# Patient Record
Sex: MALE | Race: WHITE | Employment: OTHER | URBAN - METROPOLITAN AREA
[De-identification: names, ages, dates, MRNs, and addresses within clinical notes are randomized per-mention and may not be internally consistent; named-entity substitution may affect disease eponyms.]

---

## 2017-07-17 ENCOUNTER — TELEPHONE (OUTPATIENT)
Dept: NEUROLOGY | Age: 72
End: 2017-07-17

## 2017-07-18 DIAGNOSIS — M48.062 SPINAL STENOSIS OF LUMBAR REGION WITH NEUROGENIC CLAUDICATION: Primary | ICD-10-CM

## 2017-07-18 RX ORDER — PREGABALIN 100 MG/1
100 CAPSULE ORAL 3 TIMES DAILY
Qty: 90 CAP | Refills: 2 | Status: SHIPPED | OUTPATIENT
Start: 2017-07-18 | End: 2017-12-22 | Stop reason: SDUPTHER

## 2017-07-19 ENCOUNTER — TELEPHONE (OUTPATIENT)
Dept: NEUROLOGY | Age: 72
End: 2017-07-19

## 2017-08-29 ENCOUNTER — OFFICE VISIT (OUTPATIENT)
Dept: NEUROLOGY | Age: 72
End: 2017-08-29

## 2017-08-29 DIAGNOSIS — E11.42 DIABETIC POLYNEUROPATHY ASSOCIATED WITH TYPE 2 DIABETES MELLITUS (HCC): ICD-10-CM

## 2017-08-29 DIAGNOSIS — M48.062 SPINAL STENOSIS OF LUMBAR REGION WITH NEUROGENIC CLAUDICATION: Primary | ICD-10-CM

## 2017-08-29 NOTE — PROGRESS NOTES
No chief complaint on file. HISTORY OF PRESENT ILLNESS  Meagan Jensen  came back for follow up. He was last seen about a year and a half ago and there is not much change overall. He goes for of PT about twice per year and that strengthens his gait and balance. Takes Lyrica 100 mg 3 times a day and that also helps. Occasionally he will need to use the Percocet. He has an extensive surgical history on his cervical and lumbar spine. Back in 2005, he had a cervical spine injury related to a fall that required surgery. He has had repair surgery on his cervical spine in 2006 and also had lumbar spine surgeries for sciatica like symptoms. His EMG showed peripheral neuropathy, likely related to diabetes. He walks with a cane. PHYSICAL EXAMINATION:    Weight 261 LB's, height 6 feet 1 inches    BP: 110/86, respiratory rate: 18, pulse: 88, O2 saturation: 97%      NEUROLOGICAL EXAMINATION:     Mental Status:   Alert and oriented to person, place, and time with recent and remote memory intact. Attention span and concentration are normal. Speech is fluent with a full fund of knowledge. Cranial Nerves:    II, III, IV, VI:  Visual acuity grossly intact. Visual fields are normal.    Pupils are equal, round, and reactive to light and accommodation. Extra-ocular movements are full and fluid. Fundoscopic exam was benign, no ptosis or nystagmus. V-XII: Hearing is grossly intact. Facial features are symmetric, with normal sensation and strength. The palate rises symmetrically and the tongue protrudes midline. Sternocleidomastoids 5/5. Motor Examination: Normal tone, bulk, and strength. 5/5 muscle strength throughout. No cogwheel rigidity or clonus present. Sensory exam:  Normal throughout to pinprick, temperature, and vibration sense. Normal proprioception. Coordination:  Heel-to-shin was smooth and symmetrical bilaterally.   Finger to nose and rapid arm movement testing was normal.   No resting or intention tremor    Gait and Station:  Mild unsteadiness, walks with a cane. No Rhomberg or pronator drift. No muscle wasting or fasiculations noted. Reflexes:  DTRs 3+ throughout. Toes downgoing. ASSESSMENT    ICD-10-CM ICD-9-CM    1. Spinal stenosis of lumbar region with neurogenic claudication M48.06 724.03    2. Diabetic polyneuropathy associated with type 2 diabetes mellitus (HCC) E11.42 250.60      357.2        DISCUSSION  Mr. Opal Bhandari Has low back pain and muscle spasms in the legs due to underlying chronic degenerative disc and joint disease. He also has diabetic peripheral neuropathy. He is stable on the current medication regimen i.e. Lyrica along with tizanidine at bedtime and sparingly using hydrocodone.    He should continue with physical therapy and aquatic therapy as he is doing  Follow-up annually    Vincent Klein MD  Diplomate, American Board of Psychiatry & Neurology (Neurology)  [employer] Board of Psychiatry & Neurology (Clinical Neurophysiology)  Diplomate, American Board of Electrodiagnostic Medicine

## 2018-04-03 ENCOUNTER — TELEPHONE (OUTPATIENT)
Dept: NEUROLOGY | Age: 73
End: 2018-04-03

## 2018-04-03 DIAGNOSIS — M48.062 SPINAL STENOSIS OF LUMBAR REGION WITH NEUROGENIC CLAUDICATION: Primary | ICD-10-CM

## 2018-04-03 NOTE — TELEPHONE ENCOUNTER
----- Message from Charlena Fleischer sent at 4/3/2018 10:18 AM EDT -----  Regarding: Dr. Jacquelyn Coker request for a call back in regards to a referral for a pain management doctor. He was told by the Dr. Naveen Calles that one can be recommended for him. Best contact number is [phone].

## 2018-04-10 ENCOUNTER — TELEPHONE (OUTPATIENT)
Dept: NEUROLOGY | Age: 73
End: 2018-04-10

## 2018-04-10 NOTE — TELEPHONE ENCOUNTER
Pt calling because he hasn't heard anything about scheduling with Dr. Kim Warner. PSR provided him with the number to [facility].

## 2018-04-10 NOTE — TELEPHONE ENCOUNTER
----- Message from Cas Ortega sent at 4/10/2018  2:37 PM EDT -----  Regarding: Tripp/telephone  [phone], pt said he was referred by Dr Tobi Sinclair to see Dr Kim Warner for pain management and he is still waiting for a return call back to schedule an appt.

## 2018-04-10 NOTE — TELEPHONE ENCOUNTER
Can let pt know that the only thing I could offer him would be lumbar epidural steroid injections if he has any pain radiating down legs. No neck/ cervical injections. If prior injections didn't help his back pain and/ or leg pains, then I don't think I can help with his chronic pain.

## 2018-07-24 DIAGNOSIS — M48.062 SPINAL STENOSIS OF LUMBAR REGION WITH NEUROGENIC CLAUDICATION: ICD-10-CM

## 2018-07-24 RX ORDER — PREGABALIN 100 MG/1
CAPSULE ORAL
Qty: 90 CAP | Refills: 2 | Status: SHIPPED | OUTPATIENT
Start: 2018-07-24 | End: 2018-10-05 | Stop reason: SDUPTHER

## 2018-10-05 ENCOUNTER — OFFICE VISIT (OUTPATIENT)
Dept: NEUROLOGY | Age: 73
End: 2018-10-05

## 2018-10-05 VITALS
HEIGHT: 73 IN | WEIGHT: 63 LBS | HEART RATE: 78 BPM | BODY MASS INDEX: 8.35 KG/M2 | RESPIRATION RATE: 14 BRPM | OXYGEN SATURATION: 96 % | DIASTOLIC BLOOD PRESSURE: 88 MMHG | SYSTOLIC BLOOD PRESSURE: 138 MMHG

## 2018-10-05 DIAGNOSIS — M48.062 SPINAL STENOSIS OF LUMBAR REGION WITH NEUROGENIC CLAUDICATION: ICD-10-CM

## 2018-10-05 DIAGNOSIS — E11.42 DIABETIC POLYNEUROPATHY ASSOCIATED WITH TYPE 2 DIABETES MELLITUS (HCC): Primary | ICD-10-CM

## 2018-10-05 RX ORDER — PREGABALIN 100 MG/1
CAPSULE ORAL
Qty: 90 CAP | Refills: 5 | Status: SHIPPED | OUTPATIENT
Start: 2018-10-05 | End: 2019-01-08 | Stop reason: DRUGHIGH

## 2018-10-05 NOTE — PATIENT INSTRUCTIONS

## 2018-10-05 NOTE — MR AVS SNAPSHOT
[facility] [address] 
[phone] Patient: Jorge Stinson MRN: [id number] JAW:[id number] Visit Information Date & Time Provider Department Dept. Phone Encounter #  
 10/5/2018  8:40 AM Lucie Holley MD [facility] at [address] [id number] Follow-up Instructions Return in about 1 year (around 10/5/2019). Upcoming Health Maintenance Date Due Hepatitis C Screening 1945 LIPID PANEL Q1 1945 FOOT EXAM Q1 4/8/1957 MICROALBUMIN Q1 4/8/1957 EYE EXAM RETINAL OR DILATED Q1 4/8/1957 DTaP/Tdap/Td series (1 - Tdap) 4/8/1968 Shingrix Vaccine Age 50> (1 of 2) 4/8/1997 FOBT Q 1 YEAR AGE 50-75 4/8/1997 GLAUCOMA SCREENING Q2Y 4/8/2012 HEMOGLOBIN A1C Q6M 8/26/2013 Pneumococcal 65+ Low/Medium Risk (2 of 2 - PPSV23) 12/20/2017 MEDICARE YEARLY EXAM 3/14/2018 Influenza Age 5 to Adult 8/1/2018 Allergies as of 10/5/2018  Review Complete On: 10/5/2018 By: Lucie Holley MD  
 No Known Allergies Current Immunizations  Reviewed on 12/19/2012 Name Date ZZZ-RETIRED (DO NOT USE) Pneumococcal Vaccine (Unspecified Type) 12/20/2012  3:23 PM  
  
 Not reviewed this visit You Were Diagnosed With   
  
 Codes Comments Diabetic polyneuropathy associated with type 2 diabetes mellitus ([facility]    -  Primary ICD-10-CM: E11.42 
ICD-9-CM: 250.60, 357.2 Spinal stenosis of lumbar region with neurogenic claudication     ICD-10-CM: M48.062 
ICD-9-CM: 724.03 Vitals BP Pulse Resp Height(growth percentile) Weight(growth percentile) SpO2  
 138/88 78 14 6' 1\" (1.854 m) 63 lb (28.6 kg) 96% BMI Smoking Status 8.31 kg/m2 Never Smoker Vitals History BMI and BSA Data Body Mass Index Body Surface Area 8.31 kg/m 2 1.21 m 2 Preferred Pharmacy Pharmacy Name Phone [facility], [address] [place] [address] Jame Rosenbergo [phone] Your Updated Medication List  
  
   
This list is accurate as of 10/5/18  9:09 AM.  Always use your most recent med list.  
  
  
  
  
 gemfibrozil 600 mg tablet Commonly known as:  LOPID Take 600 mg by mouth two (2) times a day. GLUCOS CHOND CPLX ADVANCED 750 mg-100 mg- 125 mg-1.65 mg Tab Generic drug:  Glucosam-Chond-Hrb 149-Hyal Ac Take 1 Cap by mouth daily. metFORMIN  mg tablet Commonly known as:  GLUCOPHAGE XR Take 500 mg by mouth daily (with breakfast). MICARDIS HCT 80-12.5 mg per tablet Generic drug:  telmisartan-hydroCHLOROthiazide Take 1 Tab by mouth daily. oxyCODONE-acetaminophen 5-325 mg per tablet Commonly known as:  PERCOCET Take 1 Tab by mouth every four (4) hours as needed for Pain. Max Daily Amount: 6 Tabs. pravastatin 40 mg tablet Commonly known as:  PRAVACHOL Take 40 mg by mouth nightly. pregabalin 100 mg capsule Commonly known as:  Annie Frank TAKE ONE CAPSULE BY MOUTH THREE TIMES A DAY  
  
 tiZANidine 2 mg tablet Commonly known as:  Jessie Freed Three times daily as needed VITAMIN B-12 PO Take 2,000 Units by mouth daily. Prescriptions Printed Refills  
 pregabalin (LYRICA) 100 mg capsule 5 Sig: TAKE ONE CAPSULE BY MOUTH THREE TIMES A DAY Class: Print Follow-up Instructions Return in about 1 year (around 10/5/2019). Patient Instructions A Healthy Lifestyle: Care Instructions Your Care Instructions A healthy lifestyle can help you feel good, stay at a healthy weight, and have plenty of energy for both work and play. A healthy lifestyle is something you can share with your whole family. A healthy lifestyle also can lower your risk for serious health problems, such as high blood pressure, heart disease, and diabetes.  
You can follow a few steps listed below to improve your health and the health of your family. Follow-up care is a key part of your treatment and safety. Be sure to make and go to all appointments, and call your doctor if you are having problems. It's also a good idea to know your test results and keep a list of the medicines you take. How can you care for yourself at home? · Do not eat too much sugar, fat, or fast foods. You can still have dessert and treats now and then. The goal is moderation. · Start small to improve your eating habits. Pay attention to portion sizes, drink less juice and soda pop, and eat more fruits and vegetables. ¨ Eat a healthy amount of food. A 3-ounce serving of meat, for example, is about the size of a deck of cards. Fill the rest of your plate with vegetables and whole grains. ¨ Limit the amount of soda and sports drinks you have every day. Drink more water when you are thirsty. ¨ Eat at least 5 servings of fruits and vegetables every day. It may seem like a lot, but it is not hard to reach this goal. A serving or helping is 1 piece of fruit, 1 cup of vegetables, or 2 cups of leafy, raw vegetables. Have an apple or some carrot sticks as an afternoon snack instead of a candy bar. Try to have fruits and/or vegetables at every meal. 
· Make exercise part of your daily routine. You may want to start with simple activities, such as walking, bicycling, or slow swimming. Try to be active 30 to 60 minutes every day. You do not need to do all 30 to 60 minutes all at once. For example, you can exercise 3 times a day for 10 or 20 minutes. Moderate exercise is safe for most people, but it is always a good idea to talk to your doctor before starting an exercise program. 
· Keep moving. Kin Jose the lawn, work in the garden, or [place]. Take the stairs instead of the elevator at work. · If you smoke, quit. People who smoke have an increased risk for heart attack, stroke, cancer, and other lung illnesses.  Quitting is hard, but there are ways to boost your chance of quitting tobacco for good. ¨ Use nicotine gum, patches, or lozenges. ¨ Ask your doctor about stop-smoking programs and medicines. ¨ Keep trying. In addition to reducing your risk of diseases in the future, you will notice some benefits soon after you stop using tobacco. If you have shortness of breath or asthma symptoms, they will likely get better within a few weeks after you quit. · Limit how much alcohol you drink. Moderate amounts of alcohol (up to 2 drinks a day for men, 1 drink a day for women) are okay. But drinking too much can lead to liver problems, high blood pressure, and other health problems. Family health If you have a family, there are many things you can do together to improve your health. · Eat meals together as a family as often as possible. · Eat healthy foods. This includes fruits, vegetables, lean meats and dairy, and whole grains. · Include your family in your fitness plan. Most people think of activities such as jogging or tennis as the way to fitness, but there are many ways you and your family can be more active. Anything that makes you breathe hard and gets your heart pumping is exercise. Here are some tips: 
¨ Walk to do errands or to take your child to school or the bus. ¨ Go for a family bike ride after dinner instead of watching TV. Where can you learn more? Go to http://huy-rubén.info/. Enter S133 in the search box to learn more about \"A Healthy Lifestyle: Care Instructions. \" Current as of: December 7, 2017 Content Version: 11.8 © 6298-2189 [employer]. Care instructions adapted under license by [employer] (which disclaims liability or warranty for this information). If you have questions about a medical condition or this instruction, always ask your healthcare professional. Mary Edwardse any warranty or liability for your use of this information. Introducing [facility]! [facility] introduces Best Teacher patient portal. Now you can access parts of your medical record, email your doctor's office, and request medication refills online. 1. In your internet browser, go to [email or web address] 2. Click on the First Time User? Click Here link in the Sign In box. You will see the New Member Sign Up page. 3. Enter your [employer] Teacher Access Code exactly as it appears below. You will not need to use this code after youve completed the sign-up process. If you do not sign up before the expiration date, you must request a new code. · [employer] Access Code: [email or web address] Expires: 1/3/2019  8:28 AM 
 
4. Enter the last four digits of your Social Security Number (xxxx) and Date of Birth (mm/dd/yyyy) as indicated and click Submit. You will be taken to the next sign-up page. 5. Create a Best Teacher ID. This will be your [employer] Teacher login ID and cannot be changed, so think of one that is secure and easy to remember. 6. Create a [employer] Teacher password. You can change your password at any time. 7. Enter your Password Reset Question and Answer. This can be used at a later time if you forget your password. 8. Enter your e-mail address. You will receive e-mail notification when new information is available in [address] 9. Click Sign Up. You can now view and download portions of your medical record. 10. Click the Download Summary menu link to download a portable copy of your medical information. If you have questions, please visit the Frequently Asked Questions section of the Best Teacher website. Remember, [employer] Teacher is NOT to be used for urgent needs. For medical emergencies, dial 911. Now available from your iPhone and Android! Please provide this summary of care documentation to your next provider. Your primary care clinician is listed as Babak Gabriel. If you have any questions after today's visit, please call [phone].

## 2018-10-05 NOTE — PROGRESS NOTES
Chief Complaint Patient presents with  Neurologic Problem HISTORY OF PRESENT ILLNESS Miri Hassan  came back for follow up. He was last seen about a year ago and there is not much change overall. He has followed up with orthopedics but they do not want to do any procedures any further. He goes for of PT about twice per year and that strengthens his gait and balance. Takes Lyrica 100 mg 3 times a day and that also helps. Occasionally he will need to use the Percocet. Does not use tizanidine anymore He has an extensive surgical history on his cervical and lumbar spine. Back in 2005, he had a cervical spine injury related to a fall that required surgery. He has had repair surgery on his cervical spine in 2006 and also had lumbar spine surgeries for sciatica like symptoms. His EMG showed peripheral neuropathy, likely related to diabetes. He walks with a cane. PHYSICAL EXAMINATION:   
Weight 261 LB's, height 6 feet 1 inches BP: 110/86, respiratory rate: 18, pulse: 88, O2 saturation: 97% NEUROLOGICAL EXAMINATION:    
Mental Status:   Alert and oriented to person, place, and time. Attention span and concentration are normal. Speech is fluent Cranial Nerves:   
II, III, IV, VI:  Visual acuity grossly intact. Visual fields are normal.   
Pupils are equal, round, and reactive to light and accommodation. Extra-ocular movements are full and fluid. V-XII: Hearing is grossly intact. Facial features are symmetric, with normal sensation and strength. The palate rises symmetrically and the tongue protrudes midline. Sternocleidomastoids 5/5. Motor Examination: Normal tone, bulk, and strength. 5/5 muscle strength throughout. No cogwheel rigidity or clonus present. Sensory exam:  Normal throughout to pinprick, temperature, and vibration sense. Normal proprioception. Coordination:  Heel-to-shin was smooth and symmetrical bilaterally. Finger to nose and rapid arm movement testing was normal.   No resting or intention tremor Gait and Station:  Mild unsteadiness, walks with a cane. No Rhomberg or pronator drift. No muscle wasting or fasiculations noted. Reflexes:  DTRs 3+ throughout. Toes downgoing. IMAGING 
MRI Results (most recent): 
 
Results from Hospital Encounter encounter on 10/06/15 MRI LUMB SPINE WO CONT Narrative **Final Report** 
 
 
ICD Codes / Adm. Diagnosis: 722.10  724.02 / Displacement of lumbar interve Spinal stenosis, lumbar paulo Examination:  MR Ignacio Santos CON  - [id number] - Oct  6 2015  5:58PM 
Accession No:  [id number] Reason:  stenosis REPORT: 
EXAM:   L SPINE WO CON 
 
INDICATION:  M 51.26. M 48.06, low back pain and weakness. Lower cavity pain  
and numbness. Prior back surgery. COMPARISON: November 14, 2012 MRI 
 
TECHNIQUE: MR imaging of the lumbar spine was performed using the following  
sequences: sagittal T1, T2, STIR;  axial T1, T2.  
 
CONTRAST:  None. FINDINGS: 
 
Conus position, morphology and signal remain normal. There is normal  
vertebral body height. Mild discogenic endplate signal changes are again  
noted anteriorly at L1-2 through L4-5. Straightening of lumbar lordosis is  
again shown. Minimal anterolisthesis at L5-S1 is again demonstrated  
measuring approximately 4 mm. No paraspinal soft tissue mass is shown. Surgical artifacts are again shown extensively in the lumbar spine. T12-L2  
and L4-5 laminectomies are again demonstrated. 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  
degree, with ankylosis on the left. T11-12: No substantial disc or facet abnormality is shown on provided  
sagittal images. Mild bilateral foraminal narrowing. T12-L1: Mild disc space narrowing and minimal diffuse disc bulging. Moderate  
bilateral facet osteoarthrosis. Mild canal stenosis. No substantial  
foraminal stenosis. L1-L2: Mild disc space narrowing. Moderate diffuse disc bulging and  
bilateral facet osteoarthrosis. Moderate canal stenosis. Moderate right and  
mild to moderate left foraminal narrowing. L2-L3:  Mild to moderate disc space narrowing. Mild diffuse disc bulging. Moderate bilateral facet osteoarthrosis. Moderate canal stenosis. Mild to  
moderate bilateral foraminal narrowing, greater on the right. L3-L4:  Moderate disc space narrowing. Mild diffuse disc bulging and  
bilateral facet osteoarthrosis. Mild to moderate canal stenosis . Mild  
bilateral foraminal narrowing, greater on the left. L4-L5:  Moderate to severe disc space narrowing. Mild diffuse disc bulging. Mild to moderate bilateral facet osteoarthrosis. No canal stenosis. Moderate  
bilateral foraminal stenosis. L5-S1:  Mild to moderate disc space narrowing. Mild to moderate diffuse disc  
bulging. Moderate bilateral facet osteoarthrosis. No canal stenosis. Moderate to severe bilateral foraminal narrowing, greater on the right. IMPRESSION: 
Multilevel postoperative and degenerative changes again demonstrated,  
without substantial change in appearance in the interval. 
  
 
 
 
Signing/Reading Doctor: ABBEY Jiang ([id number]) Approved: ABBEY Jiang ([id number])  Oct  7 2015  8:38AM                       
 
 
   
 
 
 
ASSESSMENT 
  ICD-10-CM ICD-9-CM 1. Diabetic polyneuropathy associated with type 2 diabetes mellitus (HCC) E11.42 250.60   
  357.2 2. Spinal stenosis of lumbar region with neurogenic claudication M48.062 724.03 pregabalin (LYRICA) 100 mg capsule DISCUSSION Mr. Clarisa Butcher Has low back pain and muscle spasms in the legs due to underlying chronic degenerative disc and joint disease. He also has diabetic peripheral neuropathy. He is stable on the current medication regimen i.e. Lyrica and sparingly using hydrocodone. He should continue with physical therapy and aquatic therapy as he is doing Follow-up annually Ruben Saucedo MD 
Diplomate, American Board of Psychiatry & Neurology (Neurology) [facility] Board of Psychiatry & Neurology (Clinical Neurophysiology) Diplomate, [facility] Board of Electrodiagnostic Medicine This note will not be viewable in [address]

## 2018-11-14 ENCOUNTER — HOSPITAL ENCOUNTER (OUTPATIENT)
Dept: MRI IMAGING | Age: 73
Discharge: HOME | End: 2018-11-14
Attending: ORTHOPAEDIC SURGERY
Payer: MEDICARE

## 2018-11-14 DIAGNOSIS — M48.061 LUMBAR STENOSIS: ICD-10-CM

## 2018-11-14 PROCEDURE — 72148 MRI LUMBAR SPINE W/O DYE: CPT

## 2018-12-28 ENCOUNTER — TELEPHONE (OUTPATIENT)
Dept: NEUROLOGY | Age: 73
End: 2018-12-28

## 2019-01-08 ENCOUNTER — TELEPHONE (OUTPATIENT)
Dept: NEUROLOGY | Age: 74
End: 2019-01-08

## 2019-01-08 DIAGNOSIS — E11.42 DIABETIC POLYNEUROPATHY ASSOCIATED WITH TYPE 2 DIABETES MELLITUS (HCC): Primary | ICD-10-CM

## 2019-01-08 RX ORDER — PREGABALIN 50 MG/1
50 CAPSULE ORAL 3 TIMES DAILY
Qty: 90 CAP | Refills: 1 | Status: SHIPPED | OUTPATIENT
Start: 2019-01-08 | End: 2019-03-15 | Stop reason: SDUPTHER

## 2019-01-08 NOTE — TELEPHONE ENCOUNTER
Per Dr Anne Goods: New Rx printed for half the dose      Called patient and LM to call office of above

## 2019-02-28 ENCOUNTER — HOSPITAL ENCOUNTER (OUTPATIENT)
Dept: CT IMAGING | Age: 74
Discharge: HOME | End: 2019-02-28
Attending: ORTHOPAEDIC SURGERY
Payer: MEDICARE

## 2019-02-28 DIAGNOSIS — M54.16 LUMBAR RADICULOPATHY: ICD-10-CM

## 2019-02-28 PROCEDURE — 72131 CT LUMBAR SPINE W/O DYE: CPT

## 2019-03-15 DIAGNOSIS — E11.42 DIABETIC POLYNEUROPATHY ASSOCIATED WITH TYPE 2 DIABETES MELLITUS (HCC): ICD-10-CM

## 2019-03-15 RX ORDER — PREGABALIN 50 MG/1
CAPSULE ORAL
Qty: 90 CAP | Refills: 0 | Status: SHIPPED | OUTPATIENT
Start: 2019-03-15 | End: 2019-05-06 | Stop reason: SDUPTHER

## 2019-03-19 ENCOUNTER — TELEPHONE (OUTPATIENT)
Dept: NEUROLOGY | Age: 74
End: 2019-03-19

## 2019-03-19 NOTE — TELEPHONE ENCOUNTER
Spoke with Brigette Christianson 81Jaciel at pharmacy. They just wanted verification that the order came from our office. I clarified order verbally as well.

## 2019-03-19 NOTE — TELEPHONE ENCOUNTER
* Message from [employer] below:    ----- Message from Babak Alegre sent at 3/19/2019  9:08 AM EDT -----  Regarding: Dr. Sulema Jaime, from [address] [place] [address], advised that a voided copy for Lyrica 50 mg capsules was faxed and requested a verbal request or a refax. [phone] (P) [phone].

## 2019-03-19 NOTE — TELEPHONE ENCOUNTER
----- Message from Yamil Hancock sent at 3/19/2019  9:08 AM EDT -----  Regarding: Dr. Annita Reyes., from [address] [place] [address], advised that a voided copy for Lyrica 50 mg capsules was faxed and requested a verbal request or a refax. [phone] (P) [phone].

## 2019-07-06 DIAGNOSIS — E11.42 DIABETIC POLYNEUROPATHY ASSOCIATED WITH TYPE 2 DIABETES MELLITUS (HCC): ICD-10-CM

## 2019-07-09 RX ORDER — PREGABALIN 50 MG/1
CAPSULE ORAL
Qty: 90 CAP | Refills: 0 | Status: SHIPPED | OUTPATIENT
Start: 2019-07-09 | End: 2019-07-15 | Stop reason: SDUPTHER

## 2019-07-15 DIAGNOSIS — E11.42 DIABETIC POLYNEUROPATHY ASSOCIATED WITH TYPE 2 DIABETES MELLITUS (HCC): ICD-10-CM

## 2019-07-15 RX ORDER — PREGABALIN 50 MG/1
CAPSULE ORAL
Qty: 90 CAP | Refills: 0 | Status: SHIPPED | OUTPATIENT
Start: 2019-07-15 | End: 2019-07-16 | Stop reason: SDUPTHER

## 2019-07-16 DIAGNOSIS — E11.42 DIABETIC POLYNEUROPATHY ASSOCIATED WITH TYPE 2 DIABETES MELLITUS (HCC): ICD-10-CM

## 2019-07-16 NOTE — TELEPHONE ENCOUNTER
Requested Prescriptions     Pending Prescriptions Disp Refills    pregabalin (LYRICA) 50 mg capsule 90 Cap 0     Sig: TAKE ONE CAPSULE BY MOUTH THREE TIMES A DAY     * patient is soon to go on vacation. Please advise.

## 2019-07-17 RX ORDER — PREGABALIN 50 MG/1
CAPSULE ORAL
Qty: 90 CAP | Refills: 0 | Status: SHIPPED | OUTPATIENT
Start: 2019-07-17 | End: 2019-09-19 | Stop reason: ALTCHOICE

## 2019-09-19 ENCOUNTER — OFFICE VISIT (OUTPATIENT)
Dept: NEUROLOGY | Age: 74
End: 2019-09-19

## 2019-09-19 VITALS
SYSTOLIC BLOOD PRESSURE: 118 MMHG | WEIGHT: 245 LBS | RESPIRATION RATE: 16 BRPM | DIASTOLIC BLOOD PRESSURE: 80 MMHG | BODY MASS INDEX: 32.47 KG/M2 | OXYGEN SATURATION: 97 % | HEART RATE: 64 BPM | HEIGHT: 73 IN

## 2019-09-19 DIAGNOSIS — M48.062 SPINAL STENOSIS OF LUMBAR REGION WITH NEUROGENIC CLAUDICATION: ICD-10-CM

## 2019-09-19 DIAGNOSIS — E11.42 DIABETIC POLYNEUROPATHY ASSOCIATED WITH TYPE 2 DIABETES MELLITUS (HCC): Primary | ICD-10-CM

## 2019-09-19 RX ORDER — PREGABALIN 50 MG/1
50 CAPSULE ORAL 3 TIMES DAILY
Qty: 90 CAP | Refills: 3 | Status: SHIPPED | OUTPATIENT
Start: 2019-09-19 | End: 2020-01-21

## 2019-09-19 NOTE — PROGRESS NOTES
Mr. Pito Earl presentst today to follow up spinal stenosis and neuropathy. Depression screening done on patient.

## 2019-09-19 NOTE — PATIENT INSTRUCTIONS
PRESCRIPTION REFILL POLICY [facility] Statement to Patients April 1, 2014 In an effort to ensure the large volume of patient prescription refills is processed in the most efficient and expeditious manner, we are asking our patients to assist us by calling your Pharmacy for all prescription refills, this will include also your  Mail Order Pharmacy. The pharmacy will contact our office electronically to continue the refill process. Please do not wait until the last minute to call your pharmacy. We need at least 48 hours (2days) to fill prescriptions. We also encourage you to call your pharmacy before going to  your prescription to make sure it is ready. With regard to controlled substance prescription refill requests (narcotic refills) that need to be picked up at our office, we ask your cooperation by providing us with at least 72 hours (3days) notice that you will need a refill. We will not refill narcotic prescription refill requests after 4:00pm on any weekday, Monday through Thursday, or after 2:00pm on Fridays, or on the weekends. We encourage everyone to explore another way of getting your prescription refill request processed using [employer], our patient web portal through our electronic medical record system. [employer] is an efficient and effective way to communicate your medication request directly to the office and  downloadable as an aniyah on your smart phone . [employer] also features a review functionality that allows you to view your medication list as well as leave messages for your physician. Are you ready to get connected? If so please review the attatched instructions or speak to any of our staff to get you set up right away! Thank you so much for your cooperation. Should you have any questions please contact our Practice Administrator. The Physicians and Staff,  [facility]

## 2019-09-19 NOTE — PROGRESS NOTES
Chief Complaint   Patient presents with    Neurologic Problem         HISTORY OF PRESENT ILLNESS  Carlee Holbrook  came back for follow up. Since last seen, he has had a hip replacement surgery and also had lumbar spine fusion surgery. He is currently in PT and is recovering quite well. He is now improved to ambulate with assistance and able to drive    He has diabetic polyneuropathy. Takes Lyrica and has now reduced the dose to 50 mg 3 times daily   Does not use narcotics or muscle relaxers anymore    He has an extensive surgical history on his cervical and lumbar spine. Back in 2005, he had a cervical spine injury related to a fall that required surgery. He has had repair surgery on his cervical spine in 2006 and also had lumbar spine surgeries for sciatica like symptoms. His EMG showed peripheral neuropathy, likely related to diabetes. PHYSICAL EXAMINATION:    Weight 261 LB's, height 6 feet 1 inches    BP: 110/86, respiratory rate: 18, pulse: 88, O2 saturation: 97%      NEUROLOGICAL EXAMINATION:     Mental Status:   Alert and oriented to person, place, and time. Attention span and concentration are normal. Speech is fluent       Cranial Nerves:    II, III, IV, VI:  Visual acuity grossly intact. Visual fields are normal.    Pupils are equal, round, and reactive to light and accommodation. Extra-ocular movements are full and fluid. V-XII: Hearing is grossly intact. Facial features are symmetric, with normal sensation and strength. The palate rises symmetrically and the tongue protrudes midline. Sternocleidomastoids 5/5. Motor Examination: Normal tone, bulk, and strength. 5/5 muscle strength throughout. No cogwheel rigidity or clonus present. Sensory exam:  Normal throughout to pinprick, temperature, and vibration sense. Normal proprioception. Coordination:  Heel-to-shin was smooth and symmetrical bilaterally.   Finger to nose and rapid arm movement testing was normal.   No resting or intention tremor    Gait and Station:  Mild unsteadiness, walks with a cane. No Rhomberg or pronator drift. No muscle wasting or fasiculations noted. Reflexes:  DTRs 3+ throughout. Toes downgoing. IMAGING  MRI Results (most recent):  Results from Hospital Encounter encounter on 11/14/18   MRI LUMB SPINE WO CONT    Narrative EXAM:  MRI LUMB SPINE WO CONT  Clinical history: Lumbar spinal stenosis  INDICATION:  Lumbar stenosis. COMPARISON: 10/6/2015    TECHNIQUE: MR imaging of the lumbar spine was performed using the following  sequences: sagittal T1, T2, STIR;  axial T1, T2.     CONTRAST:  None. FINDINGS:    Congenital narrowing of the canal is related to short pedicles. There is normal  vertebral body height. Mild discogenic endplate signal changes are again noted  anteriorly at L1-2 through L4-5. Straightening of lumbar lordosis is again  shown. Minimal anterolisthesis at L5-S1 is increased compared to the prior study  measuring 7 mm in current study previously 4 mm. No paraspinal soft tissue mass  is shown. . T12-L2 and L4-5 laminectomies are again demonstrated. Moderate  sacroiliac arthropathy with ankylosis on the left. T12-L1: Mild disc space narrowing and minimal diffuse disc bulging. Moderate  bilateral facet osteoarthrosis. Mild canal stenosis. No substantial foraminal  stenosis. No change. L1-L2:  Mild disc space narrowing. Moderate diffuse disc bulging and bilateral  facet osteoarthrosis. Moderate canal stenosis. Moderate right and mild to left  foraminal stenosis. No change. L2-L3:  Mild to moderate disc space narrowing. Mild diffuse disc bulging. Moderate bilateral facet arthropathy. . Moderate canal stenosis. Moderate  bilateral foraminal narrowing, greater on the right. L3-L4:  Moderate disc space narrowing. Mild diffuse disc bulging and bilateral  facet osteoarthrosis. Mild to moderate canal stenosis .  Moderate right and  severe left foraminal stenosis progressed compared to the prior study. L4-L5:  Moderate to severe disc space narrowing. Mild diffuse disc bulging. Mild  to moderate bilateral facet osteoarthrosis. No canal stenosis. Severe bilateral  foraminal stenosis progressed compared to prior exam.    L5-S1:  Mild to moderate disc space narrowing. Mild to moderate diffuse disc  bulging. Moderate bilateral facet arthropathy. . Severe bilateral foraminal  stenosis has progressed compared to the prior study. Impression IMPRESSION:  Multilevel postoperative and degenerative changes again demonstrated. Interval increased foraminal stenoses L3-4 through L5-S1. Interval increased anterolisthesis of L5 on S1.         ASSESSMENT    ICD-10-CM ICD-9-CM    1. Diabetic polyneuropathy associated with type 2 diabetes mellitus (HCC) E11.42 250.60 pregabalin (LYRICA) 50 mg capsule     357.2    2. Spinal stenosis of lumbar region with neurogenic claudication M48.062 724.03 pregabalin (LYRICA) 50 mg capsule       DISCUSSION  Mr. 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

## 2020-01-21 DIAGNOSIS — E11.42 DIABETIC POLYNEUROPATHY ASSOCIATED WITH TYPE 2 DIABETES MELLITUS (HCC): ICD-10-CM

## 2020-01-21 DIAGNOSIS — M48.062 SPINAL STENOSIS OF LUMBAR REGION WITH NEUROGENIC CLAUDICATION: ICD-10-CM

## 2020-01-21 RX ORDER — PREGABALIN 50 MG/1
CAPSULE ORAL
Qty: 90 CAP | Refills: 2 | Status: SHIPPED | OUTPATIENT
Start: 2020-01-21 | End: 2020-04-20

## 2020-02-13 ENCOUNTER — HOSPITAL ENCOUNTER (OUTPATIENT)
Dept: MRI IMAGING | Age: 75
Discharge: HOME | End: 2020-02-13
Attending: ORTHOPAEDIC SURGERY
Payer: MEDICARE

## 2020-02-13 DIAGNOSIS — M54.12 CERVICAL RADICULOPATHY: ICD-10-CM

## 2020-02-13 PROCEDURE — 72141 MRI NECK SPINE W/O DYE: CPT

## 2020-04-17 DIAGNOSIS — M48.062 SPINAL STENOSIS OF LUMBAR REGION WITH NEUROGENIC CLAUDICATION: ICD-10-CM

## 2020-04-17 DIAGNOSIS — E11.42 DIABETIC POLYNEUROPATHY ASSOCIATED WITH TYPE 2 DIABETES MELLITUS (HCC): ICD-10-CM

## 2020-04-20 RX ORDER — PREGABALIN 50 MG/1
CAPSULE ORAL
Qty: 90 CAP | Refills: 1 | Status: SHIPPED | OUTPATIENT
Start: 2020-04-20 | End: 2020-04-21 | Stop reason: SDUPTHER

## 2020-04-21 DIAGNOSIS — M48.062 SPINAL STENOSIS OF LUMBAR REGION WITH NEUROGENIC CLAUDICATION: ICD-10-CM

## 2020-04-21 DIAGNOSIS — E11.42 DIABETIC POLYNEUROPATHY ASSOCIATED WITH TYPE 2 DIABETES MELLITUS (HCC): ICD-10-CM

## 2020-04-21 RX ORDER — PREGABALIN 50 MG/1
CAPSULE ORAL
Qty: 90 CAP | Refills: 1 | Status: SHIPPED | OUTPATIENT
Start: 2020-04-21 | End: 2020-04-27

## 2020-04-21 NOTE — TELEPHONE ENCOUNTER
----- Message from [facility] sent at 4/21/2020 12:35 PM EDT -----  Regarding: Dr. Carrillo Notch (if not patient):      Relationship of caller (if not patient):      Best contact number(s): [phone]      Name of medication and dosage if known: Pregabalin 50 mg      Is patient out of this medication (yes/no):  No, but close      Pharmacy name: [address] [place] [address] listed in chart? (yes/no): Yes  Pharmacy phone number: [phone]      Details to clarify the request: Currently has no refills      [facility]

## 2020-04-24 DIAGNOSIS — M48.062 SPINAL STENOSIS OF LUMBAR REGION WITH NEUROGENIC CLAUDICATION: ICD-10-CM

## 2020-04-24 DIAGNOSIS — E11.42 DIABETIC POLYNEUROPATHY ASSOCIATED WITH TYPE 2 DIABETES MELLITUS (HCC): ICD-10-CM

## 2020-04-27 RX ORDER — PREGABALIN 50 MG/1
CAPSULE ORAL
Qty: 90 CAP | Refills: 1 | Status: SHIPPED | OUTPATIENT
Start: 2020-04-27 | End: 2020-09-02

## 2020-10-13 ENCOUNTER — OFFICE VISIT (OUTPATIENT)
Dept: NEUROLOGY | Facility: CLINIC | Age: 75
End: 2020-10-13
Payer: MEDICARE

## 2020-10-13 VITALS
WEIGHT: 245 LBS | BODY MASS INDEX: 32.47 KG/M2 | DIASTOLIC BLOOD PRESSURE: 72 MMHG | HEART RATE: 73 BPM | HEIGHT: 73 IN | OXYGEN SATURATION: 98 % | SYSTOLIC BLOOD PRESSURE: 120 MMHG | RESPIRATION RATE: 16 BRPM

## 2020-10-13 DIAGNOSIS — E11.42 DIABETIC POLYNEUROPATHY ASSOCIATED WITH TYPE 2 DIABETES MELLITUS (HCC): Primary | ICD-10-CM

## 2020-10-13 PROCEDURE — 3017F COLORECTAL CA SCREEN DOC REV: CPT | Performed by: PSYCHIATRY & NEUROLOGY

## 2020-10-13 PROCEDURE — 99214 OFFICE O/P EST MOD 30 MIN: CPT | Performed by: PSYCHIATRY & NEUROLOGY

## 2020-10-13 PROCEDURE — G8417 CALC BMI ABV UP PARAM F/U: HCPCS | Performed by: PSYCHIATRY & NEUROLOGY

## 2020-10-13 PROCEDURE — 2022F DILAT RTA XM EVC RTNOPTHY: CPT | Performed by: PSYCHIATRY & NEUROLOGY

## 2020-10-13 PROCEDURE — G8427 DOCREV CUR MEDS BY ELIG CLIN: HCPCS | Performed by: PSYCHIATRY & NEUROLOGY

## 2020-10-13 PROCEDURE — 3046F HEMOGLOBIN A1C LEVEL >9.0%: CPT | Performed by: PSYCHIATRY & NEUROLOGY

## 2020-10-13 PROCEDURE — G8536 NO DOC ELDER MAL SCRN: HCPCS | Performed by: PSYCHIATRY & NEUROLOGY

## 2020-10-13 PROCEDURE — G8510 SCR DEP NEG, NO PLAN REQD: HCPCS | Performed by: PSYCHIATRY & NEUROLOGY

## 2020-10-13 PROCEDURE — 1101F PT FALLS ASSESS-DOCD LE1/YR: CPT | Performed by: PSYCHIATRY & NEUROLOGY

## 2020-10-13 RX ORDER — PREGABALIN 50 MG/1
50 CAPSULE ORAL 4 TIMES DAILY
Qty: 120 CAP | Refills: 3 | Status: SHIPPED | OUTPATIENT
Start: 2020-10-13 | End: 2021-03-01

## 2020-10-13 NOTE — PATIENT INSTRUCTIONS
PRESCRIPTION REFILL POLICY [facility] Statement to Patients April 1, 2014 In an effort to ensure the large volume of patient prescription refills is processed in the most efficient and expeditious manner, we are asking our patients to assist us by calling your Pharmacy for all prescription refills, this will include also your  Mail Order Pharmacy. The pharmacy will contact our office electronically to continue the refill process. Please do not wait until the last minute to call your pharmacy. We need at least 48 hours (2days) to fill prescriptions. We also encourage you to call your pharmacy before going to  your prescription to make sure it is ready. With regard to controlled substance prescription refill requests (narcotic refills) that need to be picked up at our office, we ask your cooperation by providing us with at least 72 hours (3days) notice that you will need a refill. We will not refill narcotic prescription refill requests after 4:00pm on any weekday, Monday through Thursday, or after 2:00pm on Fridays, or on the weekends. We encourage everyone to explore another way of getting your prescription refill request processed using [employer], our patient web portal through our electronic medical record system. [employer] is an efficient and effective way to communicate your medication request directly to the office and  downloadable as an aniyah on your smart phone . [employer] also features a review functionality that allows you to view your medication list as well as leave messages for your physician. Are you ready to get connected? If so please review the attatched instructions or speak to any of our staff to get you set up right away! Thank you so much for your cooperation. Should you have any questions please contact our Practice Administrator. The Physicians and Staff,  [facility]

## 2020-10-13 NOTE — PROGRESS NOTES
Chief Complaint   Patient presents with    Neurologic Problem         HISTORY OF PRESENT ILLNESS  Omer Zhong  came back for follow up. Overall, he remained stable. He does feel more numbness in his feet bilaterally and his balance has deteriorated some. He has not been able to  stay active because of the ongoing pandemic. Takes Lyrica 50 mg tablets up to 4-day  Has not been in physical therapy for quite some time. He was in therapy last year after hip replacement surgery and lumbar spine fusion surgery      He has an extensive surgical history on his cervical and lumbar spine. Back in 2005, he had a cervical spine injury related to a fall that required surgery. He has had repair surgery on his cervical spine in 2006 and also had lumbar spine surgeries for sciatica like symptoms. His EMG showed peripheral neuropathy, likely related to diabetes. PHYSICAL EXAMINATION:    Vitals:    10/13/20 1046   Pulse: 73   BP: 120/72   Resp: 16       NEUROLOGICAL EXAMINATION:     Mental Status:   Alert and oriented to person, place, and time. Attention span and concentration are normal. Speech is fluent       Cranial Nerves:    II, III, IV, VI:  Visual acuity grossly intact. Visual fields are normal.    Pupils are equal, round, and reactive to light and accommodation. Extra-ocular movements are full and fluid. V-XII: Hearing is grossly intact. Facial features are symmetric, with normal sensation and strength. The palate rises symmetrically and the tongue protrudes midline. Sternocleidomastoids 5/5. Motor Examination: Normal tone, bulk, and strength. 5/5 muscle strength throughout. No cogwheel rigidity or clonus present. Sensory exam: Impaired distally below the knees to pinprick, temperature, and vibration sense. Impaired  proprioception.       Coordination:  Finger to nose and rapid arm movement testing was normal.   No resting or intention tremor    Gait and Station:  Mild unsteadiness, walks with a cane. No Rhomberg or pronator drift. No muscle wasting or fasiculations noted. Reflexes:  DTRs 3+ throughout. Toes downgoing. IMAGING  Lab Results   Component Value Date/Time    WBC 6.8 02/26/2013 01:43 PM    HGB 13.5 02/26/2013 01:43 PM    HCT 39.9 02/26/2013 01:43 PM    PLATELET 936 53/43/4460 01:43 PM    MCV 90.9 02/26/2013 01:43 PM     Lab Results   Component Value Date/Time    Sodium 135 (L) 02/26/2013 01:43 PM    Potassium 5.1 02/26/2013 01:43 PM    Chloride 100 02/26/2013 01:43 PM    CO2 28 02/26/2013 01:43 PM    Anion gap 7 02/26/2013 01:43 PM    Glucose 84 02/26/2013 01:43 PM    BUN 22 (H) 02/26/2013 01:43 PM    Creatinine 0.84 02/26/2013 01:43 PM    BUN/Creatinine ratio 26 (H) 02/26/2013 01:43 PM    GFR est AA >60 02/26/2013 01:43 PM    GFR est non-AA >60 02/26/2013 01:43 PM    Calcium 9.2 02/26/2013 01:43 PM    Bilirubin, total 0.4 12/19/2012 06:00 AM    Alk. phosphatase 90 12/19/2012 06:00 AM    Protein, total 7.6 12/19/2012 06:00 AM    Albumin 3.8 12/19/2012 06:00 AM    Globulin 3.8 12/19/2012 06:00 AM    A-G Ratio 1.0 (L) 12/19/2012 06:00 AM    ALT (SGPT) 54 12/19/2012 06:00 AM    AST (SGOT) 39 (H) 12/19/2012 06:00 AM     Lab Results   Component Value Date/Time    Hemoglobin A1c 6.0 02/26/2013 01:43 PM         ASSESSMENT    ICD-10-CM ICD-9-CM    1. Diabetic polyneuropathy associated with type 2 diabetes mellitus (HCC)  E11.42 250.60 pregabalin (LYRICA) 50 mg capsule     357.2        DISCUSSION  Mr. Ceasar Stevens Has diabetic peripheral neuropathy which is causing pain and paresthesias in his legs below the knees.   Symptoms are controlled with Lyrica 50 mg 4 times daily  Is sensory exam has deteriorated some in the past year which could be due to lack of activity or exercise  He was encouraged to start a home exercise program  He also had degenerative disc and joint disease in the lumbar spine for which he has had fusion surgery   Refills were provided for the medications and Lyrica dose was increased to 4 times from 3 times daily    Follow-up annually    Ede Nolasco MD  Diplomate, American Board of Psychiatry & Neurology (Neurology)  Diplomate, American Board of Psychiatry & Neurology (Clinical Neurophysiology)  Diplomate, American Board of Electrodiagnostic Medicine  This note will not be viewable in [address]

## 2021-03-01 DIAGNOSIS — E11.42 DIABETIC POLYNEUROPATHY ASSOCIATED WITH TYPE 2 DIABETES MELLITUS (HCC): ICD-10-CM

## 2021-03-01 RX ORDER — PREGABALIN 50 MG/1
CAPSULE ORAL
Qty: 120 CAP | Refills: 2 | Status: SHIPPED | OUTPATIENT
Start: 2021-03-01 | End: 2021-06-02

## 2021-06-02 ENCOUNTER — TELEPHONE (OUTPATIENT)
Dept: NEUROLOGY | Age: 76
End: 2021-06-02

## 2021-06-02 DIAGNOSIS — E11.42 DIABETIC POLYNEUROPATHY ASSOCIATED WITH TYPE 2 DIABETES MELLITUS (HCC): ICD-10-CM

## 2021-06-02 RX ORDER — PREGABALIN 50 MG/1
CAPSULE ORAL
Qty: 120 CAPSULE | Refills: 1 | Status: SHIPPED | OUTPATIENT
Start: 2021-06-02 | End: 2021-07-29

## 2021-06-02 NOTE — TELEPHONE ENCOUNTER
Pt calling and would like the prescription could be sent back over to [facility] if possible, [facility] said they did not receive the prescription.  Please call back

## 2021-07-29 DIAGNOSIS — E11.42 DIABETIC POLYNEUROPATHY ASSOCIATED WITH TYPE 2 DIABETES MELLITUS (HCC): ICD-10-CM

## 2021-07-29 RX ORDER — PREGABALIN 50 MG/1
CAPSULE ORAL
Qty: 120 CAPSULE | Refills: 0 | Status: SHIPPED | OUTPATIENT
Start: 2021-07-29 | End: 2021-07-29 | Stop reason: SDUPTHER

## 2021-07-29 NOTE — TELEPHONE ENCOUNTER
Requested Prescriptions     Pending Prescriptions Disp Refills    pregabalin (LYRICA) 50 mg capsule 120 Capsule 0

## 2021-07-30 RX ORDER — PREGABALIN 50 MG/1
CAPSULE ORAL
Qty: 120 CAPSULE | Refills: 1 | Status: SHIPPED | OUTPATIENT
Start: 2021-07-30 | End: 2021-11-03

## 2021-08-09 ENCOUNTER — TELEPHONE (OUTPATIENT)
Dept: NEUROLOGY | Age: 76
End: 2021-08-09

## 2021-08-09 NOTE — TELEPHONE ENCOUNTER
Patient calling to get scheduled for his annual appt and was scheduled on 3/15. Would like to know what to do about his lyrica medication.  Please call back

## 2021-08-10 NOTE — TELEPHONE ENCOUNTER
Spoke with patient, informed him per -He should continue Lyrica.  Prescription refill was sent in to [address] [place] [address] about 10 days ago      He stated on his next refill he would prefer a 90 day supply if possible.

## 2021-11-03 DIAGNOSIS — E11.42 DIABETIC POLYNEUROPATHY ASSOCIATED WITH TYPE 2 DIABETES MELLITUS (HCC): ICD-10-CM

## 2021-11-03 RX ORDER — PREGABALIN 50 MG/1
CAPSULE ORAL
Qty: 120 CAPSULE | Refills: 3 | Status: SHIPPED | OUTPATIENT
Start: 2021-11-03 | End: 2022-03-07

## 2022-03-06 DIAGNOSIS — E11.42 DIABETIC POLYNEUROPATHY ASSOCIATED WITH TYPE 2 DIABETES MELLITUS (HCC): ICD-10-CM

## 2022-03-07 RX ORDER — PREGABALIN 50 MG/1
CAPSULE ORAL
Qty: 120 CAPSULE | Refills: 1 | Status: SHIPPED | OUTPATIENT
Start: 2022-03-07 | End: 2022-05-12

## 2022-03-15 ENCOUNTER — OFFICE VISIT (OUTPATIENT)
Dept: NEUROLOGY | Age: 77
End: 2022-03-15
Payer: MEDICARE

## 2022-03-15 VITALS
DIASTOLIC BLOOD PRESSURE: 82 MMHG | HEIGHT: 73 IN | HEART RATE: 64 BPM | WEIGHT: 245 LBS | BODY MASS INDEX: 32.47 KG/M2 | SYSTOLIC BLOOD PRESSURE: 130 MMHG | RESPIRATION RATE: 18 BRPM | OXYGEN SATURATION: 99 %

## 2022-03-15 DIAGNOSIS — M48.062 SPINAL STENOSIS OF LUMBAR REGION WITH NEUROGENIC CLAUDICATION: ICD-10-CM

## 2022-03-15 DIAGNOSIS — E11.42 DIABETIC POLYNEUROPATHY ASSOCIATED WITH TYPE 2 DIABETES MELLITUS (HCC): Primary | ICD-10-CM

## 2022-03-15 PROCEDURE — G8417 CALC BMI ABV UP PARAM F/U: HCPCS | Performed by: PSYCHIATRY & NEUROLOGY

## 2022-03-15 PROCEDURE — G8427 DOCREV CUR MEDS BY ELIG CLIN: HCPCS | Performed by: PSYCHIATRY & NEUROLOGY

## 2022-03-15 PROCEDURE — G8536 NO DOC ELDER MAL SCRN: HCPCS | Performed by: PSYCHIATRY & NEUROLOGY

## 2022-03-15 PROCEDURE — 3046F HEMOGLOBIN A1C LEVEL >9.0%: CPT | Performed by: PSYCHIATRY & NEUROLOGY

## 2022-03-15 PROCEDURE — 2022F DILAT RTA XM EVC RTNOPTHY: CPT | Performed by: PSYCHIATRY & NEUROLOGY

## 2022-03-15 PROCEDURE — G8510 SCR DEP NEG, NO PLAN REQD: HCPCS | Performed by: PSYCHIATRY & NEUROLOGY

## 2022-03-15 PROCEDURE — 99214 OFFICE O/P EST MOD 30 MIN: CPT | Performed by: PSYCHIATRY & NEUROLOGY

## 2022-03-15 PROCEDURE — 3017F COLORECTAL CA SCREEN DOC REV: CPT | Performed by: PSYCHIATRY & NEUROLOGY

## 2022-03-15 PROCEDURE — 1101F PT FALLS ASSESS-DOCD LE1/YR: CPT | Performed by: PSYCHIATRY & NEUROLOGY

## 2022-03-15 NOTE — PROGRESS NOTES
Chief Complaint   Patient presents with    Neurologic Problem         HISTORY OF PRESENT ILLNESS  Mara Pandya  came back for follow up. Overall, he remained stable. Pain and numbness is controlled with Lyrica 50 mg in the morning, afternoon and 100 mg at bedtime. He uses 1-2 canes to stabilize himself  He has also followed up with spine surgery and had repeat imaging done on his lumbar and cervical spine and they looked stable. He was given a prescription for physical therapy but he has not started it as yet    He has an extensive surgical history on his cervical and lumbar spine. Back in 2005, he had a cervical spine injury related to a fall that required surgery. He has had repair surgery on his cervical spine in 2006 and also had lumbar spine surgeries for sciatica like symptoms. His EMG showed peripheral neuropathy, likely related to diabetes. Current Outpatient Medications   Medication Sig    pregabalin (LYRICA) 50 mg capsule TAKE ONE CAPSULE BY MOUTH FOUR TIMES A DAY *MAXIMUN OF 4 CAPSULES PER DAY*    METOPROLOL TARTRATE PO Take 50 mg by mouth.  pravastatin (PRAVACHOL) 40 mg tablet Take 40 mg by mouth nightly.  metFORMIN ER (GLUCOPHAGE XR) 500 mg tablet Take 500 mg by mouth daily (with breakfast). No current facility-administered medications for this visit. PHYSICAL EXAMINATION:    Vitals:    03/15/22 0927   Pulse: 64   BP: 130/82   Resp: 18     NEUROLOGICAL EXAMINATION:     Mental Status:   Alert and oriented to person, place, and time. Attention span and concentration are normal. Speech is fluent       Cranial Nerves:    II, III, IV, VI:  Visual acuity grossly intact. Visual fields are normal.    Pupils are equal, round, and reactive to light and accommodation. Extra-ocular movements are full and fluid. V-XII: Hearing is grossly intact. Facial features are symmetric, with normal sensation and strength. The palate rises symmetrically and the tongue protrudes midline. Sternocleidomastoids 5/5. Motor Examination: Normal tone, bulk, and strength. 5/5 muscle strength throughout. No cogwheel rigidity or clonus present. Sensory exam: Impaired distally below the knees to pinprick, temperature, and vibration sense. Impaired  proprioception. Coordination:  Finger to nose and rapid arm movement testing was normal.   No resting or intention tremor    Gait and Station: Unsteady gait, walks with a cane/s. No Rhomberg or pronator drift. No muscle wasting or fasiculations noted. Reflexes:  DTRs 3+ throughout. Toes downgoing. IMAGING  Lab Results   Component Value Date/Time    WBC 6.8 02/26/2013 01:43 PM    HGB 13.5 02/26/2013 01:43 PM    HCT 39.9 02/26/2013 01:43 PM    PLATELET 331 55/27/5701 01:43 PM    MCV 90.9 02/26/2013 01:43 PM     Lab Results   Component Value Date/Time    Sodium 135 (L) 02/26/2013 01:43 PM    Potassium 5.1 02/26/2013 01:43 PM    Chloride 100 02/26/2013 01:43 PM    CO2 28 02/26/2013 01:43 PM    Anion gap 7 02/26/2013 01:43 PM    Glucose 84 02/26/2013 01:43 PM    BUN 22 (H) 02/26/2013 01:43 PM    Creatinine 0.84 02/26/2013 01:43 PM    BUN/Creatinine ratio 26 (H) 02/26/2013 01:43 PM    GFR est AA >60 02/26/2013 01:43 PM    GFR est non-AA >60 02/26/2013 01:43 PM    Calcium 9.2 02/26/2013 01:43 PM    Bilirubin, total 0.4 12/19/2012 06:00 AM    Alk. phosphatase 90 12/19/2012 06:00 AM    Protein, total 7.6 12/19/2012 06:00 AM    Albumin 3.8 12/19/2012 06:00 AM    Globulin 3.8 12/19/2012 06:00 AM    A-G Ratio 1.0 (L) 12/19/2012 06:00 AM    ALT (SGPT) 54 12/19/2012 06:00 AM    AST (SGOT) 39 (H) 12/19/2012 06:00 AM     Lab Results   Component Value Date/Time    Hemoglobin A1c 6.0 02/26/2013 01:43 PM     MRI Results (most recent):  Results from [facility] encounter on 02/13/20    MRI CERV SPINE WO CONT    Narrative  EXAM: MRI CERV SPINE WO CONT    INDICATION: Numbness in the feet.  Radiculopathy, cervical region    COMPARISON: 4/14/2015    TECHNIQUE: MR imaging of the cervical spine was performed using the following  sequences: sagittal T1, T2, STIR;  axial T2, T1.    CONTRAST:  None. FINDINGS:    Significant anterior bridging osteophytes are present likely related to DISH. There is osseous fusion at C3-4. The patient is status post anterior cervical  fusion at C5-6 with hardware. The patient is status post left-sided  laminoplasties from C3 to C7. Osseous bridging is seen across the disc space  with a prosthetic disc in place. Vertebral body heights are maintained. No acute  fracture. A 9 mm area of fatty deposition is present at C3-4. The craniocervical junction is intact. There is an unchanged 6 mm focus of  chronic myelomalacia in the cord at C3-4. Small foci of chronic malacia are  seen at C6-7. The paraspinal soft tissues are within normal limits. C2-C3: Unchanged mild central disc osteophyte complex without significant spinal  stenosis. Unchanged mild bilateral neural foraminal narrowing. C3-C4: Fused. Mild central osteophyte with minimal spinal stenosis. There is  moderate to severe right and severe left neural foraminal narrowing. Unchanged. C4-C5: Mild broad-based disc osteophyte complex that is worst centrally. No  significant spinal stenosis. There is moderate bilateral neural foraminal  narrowing. Unchanged. C5-C6: Anterior cervical fusion. Mild broad-based disc osteophyte complex with  thickening of ligamentum flavum causing mild spinal stenosis. There is severe  bilateral neural foraminal narrowing. Unchanged. C6-C7: Severe disc space narrowing. No significant spinal stenosis. There is  severe bilateral neural foraminal narrowing. C7-T1: Moderate disc space narrowing. No significant spinal stenosis. There is  mild right and moderate left neural foraminal narrowing.     T1-2: Mild broad-based disc osteophyte complex with thickening of ligamentum  flavum causing moderate spinal stenosis which has worsened. There is severe  right and moderate left neural foraminal narrowing which is relatively  unchanged. Impression  IMPRESSION:    1. Unchanged small areas of chronic myelomalacia at C3-4 and C6-7.  2. Osseous fusion at C3-4. Anterior cervical fusion at C5-6. Posterior left  laminoplasties from C3 to C7.  3. Worsened spondylosis at T1-2. Otherwise unchanged multilevel spondylosis as  above. ASSESSMENT    ICD-10-CM ICD-9-CM    1. Diabetic polyneuropathy associated with type 2 diabetes mellitus (HCC)  E11.42 250.60      357.2    2. Spinal stenosis of lumbar region with neurogenic claudication  M48.062 724.03        DISCUSSION  Mr. Betzaida Leroy has diabetic peripheral neuropathy which is causing pain and paresthesias in his legs below the knees.   Cervical spine imaging in the past has also shown myelomalacia in the cervical cord  He also had degenerative disc and joint disease for which he has had fusion surgeries in the cervical and lumbar spine     Symptoms are controlled with Lyrica 50 mg 4 times daily  He is planning to go back for physical therapy for gait and balance training  Refills were provided for Lyrica total dose of 200 mg/day     Follow-up annually    Suni Gary MD  Diplomate, American Board of Psychiatry & Neurology (Neurology)  Diplomate, American Board of Psychiatry & Neurology (Clinical Neurophysiology)  Diplomate, American Board of Electrodiagnostic Medicine

## 2022-03-18 PROBLEM — E11.42 DIABETIC POLYNEUROPATHY ASSOCIATED WITH TYPE 2 DIABETES MELLITUS (HCC): Status: ACTIVE | Noted: 2017-08-29

## 2022-07-12 DIAGNOSIS — E11.42 DIABETIC POLYNEUROPATHY ASSOCIATED WITH TYPE 2 DIABETES MELLITUS (HCC): ICD-10-CM

## 2022-07-13 RX ORDER — PREGABALIN 50 MG/1
CAPSULE ORAL
Qty: 120 CAPSULE | Refills: 1 | Status: SHIPPED | OUTPATIENT
Start: 2022-07-13 | End: 2022-09-07

## 2022-09-07 DIAGNOSIS — E11.42 DIABETIC POLYNEUROPATHY ASSOCIATED WITH TYPE 2 DIABETES MELLITUS (HCC): ICD-10-CM

## 2022-09-07 RX ORDER — PREGABALIN 50 MG/1
CAPSULE ORAL
Qty: 120 CAPSULE | Refills: 1 | Status: SHIPPED | OUTPATIENT
Start: 2022-09-07

## 2022-11-07 DIAGNOSIS — E11.42 DIABETIC POLYNEUROPATHY ASSOCIATED WITH TYPE 2 DIABETES MELLITUS (HCC): ICD-10-CM

## 2022-11-07 RX ORDER — PREGABALIN 50 MG/1
CAPSULE ORAL
Qty: 120 CAPSULE | Refills: 1 | Status: SHIPPED | OUTPATIENT
Start: 2022-11-07

## 2023-01-16 DIAGNOSIS — E11.42 DIABETIC POLYNEUROPATHY ASSOCIATED WITH TYPE 2 DIABETES MELLITUS (HCC): ICD-10-CM

## 2023-01-16 RX ORDER — PREGABALIN 50 MG/1
CAPSULE ORAL
Qty: 120 CAPSULE | Refills: 1 | Status: SHIPPED | OUTPATIENT
Start: 2023-01-16

## 2023-03-14 ENCOUNTER — OFFICE VISIT (OUTPATIENT)
Dept: NEUROLOGY | Age: 78
End: 2023-03-14
Payer: MEDICARE

## 2023-03-14 VITALS
HEART RATE: 56 BPM | WEIGHT: 260 LBS | HEIGHT: 73 IN | BODY MASS INDEX: 34.46 KG/M2 | SYSTOLIC BLOOD PRESSURE: 136 MMHG | DIASTOLIC BLOOD PRESSURE: 78 MMHG | OXYGEN SATURATION: 96 % | RESPIRATION RATE: 16 BRPM

## 2023-03-14 DIAGNOSIS — E11.42 DIABETIC POLYNEUROPATHY ASSOCIATED WITH TYPE 2 DIABETES MELLITUS (HCC): Primary | ICD-10-CM

## 2023-03-14 DIAGNOSIS — M48.062 SPINAL STENOSIS OF LUMBAR REGION WITH NEUROGENIC CLAUDICATION: ICD-10-CM

## 2023-03-14 PROCEDURE — G8510 SCR DEP NEG, NO PLAN REQD: HCPCS | Performed by: PSYCHIATRY & NEUROLOGY

## 2023-03-14 PROCEDURE — G8536 NO DOC ELDER MAL SCRN: HCPCS | Performed by: PSYCHIATRY & NEUROLOGY

## 2023-03-14 PROCEDURE — 2022F DILAT RTA XM EVC RTNOPTHY: CPT | Performed by: PSYCHIATRY & NEUROLOGY

## 2023-03-14 PROCEDURE — 3017F COLORECTAL CA SCREEN DOC REV: CPT | Performed by: PSYCHIATRY & NEUROLOGY

## 2023-03-14 PROCEDURE — 99213 OFFICE O/P EST LOW 20 MIN: CPT | Performed by: PSYCHIATRY & NEUROLOGY

## 2023-03-14 PROCEDURE — G8427 DOCREV CUR MEDS BY ELIG CLIN: HCPCS | Performed by: PSYCHIATRY & NEUROLOGY

## 2023-03-14 PROCEDURE — G8417 CALC BMI ABV UP PARAM F/U: HCPCS | Performed by: PSYCHIATRY & NEUROLOGY

## 2023-03-14 PROCEDURE — 1123F ACP DISCUSS/DSCN MKR DOCD: CPT | Performed by: PSYCHIATRY & NEUROLOGY

## 2023-03-14 PROCEDURE — 1101F PT FALLS ASSESS-DOCD LE1/YR: CPT | Performed by: PSYCHIATRY & NEUROLOGY

## 2023-03-14 PROCEDURE — 3046F HEMOGLOBIN A1C LEVEL >9.0%: CPT | Performed by: PSYCHIATRY & NEUROLOGY

## 2023-03-14 RX ORDER — PREGABALIN 50 MG/1
CAPSULE ORAL
Qty: 120 CAPSULE | Refills: 3 | Status: SHIPPED | OUTPATIENT
Start: 2023-03-14

## 2023-03-14 NOTE — PROGRESS NOTES
Chief Complaint   Patient presents with    Follow-up     Numbness and tingling (feet/ankles): Patient reports it has gotten worse. Patient reports it lasts all day. HISTORY OF PRESENT ILLNESS  Dawit Wilhelm  came back for follow up. Overall, he remained stable. Pain and numbness is controlled with Lyrica 50 mg, 4 times a day   No other changes to his health or medications    He uses 1-2 canes to stabilize himself. He has also followed up with spine surgery and had repeat imaging done on his lumbar and cervical spine and they looked stable. He is planning to go for a repeat course of PT    RECAP  He has an extensive surgical history on his cervical and lumbar spine. Back in 2005, he had a cervical spine injury related to a fall that required surgery. He has had repair surgery on his cervical spine in 2006 and also had lumbar spine surgeries for sciatica like symptoms. His EMG showed peripheral neuropathy, likely related to diabetes. Current Outpatient Medications   Medication Sig    pregabalin (LYRICA) 50 mg capsule Take 1 cap qid    METOPROLOL TARTRATE PO Take 50 mg by mouth.    pravastatin (PRAVACHOL) 40 mg tablet Take 40 mg by mouth nightly. metFORMIN ER (GLUCOPHAGE XR) 500 mg tablet Take 500 mg by mouth daily (with breakfast). No current facility-administered medications for this visit. PHYSICAL EXAMINATION:    Vitals:    03/14/23 0926   Pulse: (!) 56   BP: 136/78   Resp: 16     NEUROLOGICAL EXAMINATION:     Mental Status:   Alert and oriented to person, place, and time. Attention span and concentration are normal. Speech is fluent       Cranial Nerves:    II, III, IV, VI:  Visual acuity grossly intact. Visual fields are normal.    Pupils are equal, round, and reactive to light and accommodation. Extra-ocular movements are full and fluid. V-XII: Hearing is grossly intact. Facial features are symmetric, with normal sensation and strength.   The palate rises symmetrically and the tongue protrudes midline. Sternocleidomastoids 5/5. Motor Examination: Normal tone, bulk, and strength. 5/5 muscle strength throughout. No cogwheel rigidity or clonus present. Sensory exam: Impaired distally below the knees to pinprick, temperature, and vibration sense. Impaired  proprioception. Coordination:  Finger to nose and rapid arm movement testing was normal.   No resting or intention tremor    Gait and Station: Unsteady gait, walks with a cane/s. No Rhomberg or pronator drift. No muscle wasting or fasiculations noted. Reflexes:  DTRs 3+ throughout. Toes downgoing. IMAGING  Lab Results   Component Value Date/Time    WBC 6.8 02/26/2013 01:43 PM    HGB 13.5 02/26/2013 01:43 PM    HCT 39.9 02/26/2013 01:43 PM    PLATELET 532 86/63/5801 01:43 PM    MCV 90.9 02/26/2013 01:43 PM     Lab Results   Component Value Date/Time    Sodium 135 (L) 02/26/2013 01:43 PM    Potassium 5.1 02/26/2013 01:43 PM    Chloride 100 02/26/2013 01:43 PM    CO2 28 02/26/2013 01:43 PM    Anion gap 7 02/26/2013 01:43 PM    Glucose 84 02/26/2013 01:43 PM    BUN 22 (H) 02/26/2013 01:43 PM    Creatinine 0.84 02/26/2013 01:43 PM    BUN/Creatinine ratio 26 (H) 02/26/2013 01:43 PM    GFR est AA >60 02/26/2013 01:43 PM    GFR est non-AA >60 02/26/2013 01:43 PM    Calcium 9.2 02/26/2013 01:43 PM    Bilirubin, total 0.4 12/19/2012 06:00 AM    Alk. phosphatase 90 12/19/2012 06:00 AM    Protein, total 7.6 12/19/2012 06:00 AM    Albumin 3.8 12/19/2012 06:00 AM    Globulin 3.8 12/19/2012 06:00 AM    A-G Ratio 1.0 (L) 12/19/2012 06:00 AM    ALT (SGPT) 54 12/19/2012 06:00 AM    AST (SGOT) 39 (H) 12/19/2012 06:00 AM     Lab Results   Component Value Date/Time    Hemoglobin A1c 6.0 02/26/2013 01:43 PM     MRI Results (most recent):  Results from [place] encounter on 02/13/20    MRI CERV SPINE WO CONT    Narrative  EXAM: MRI CERV SPINE WO CONT    INDICATION: Numbness in the feet.  Radiculopathy, cervical region    COMPARISON: 4/14/2015    TECHNIQUE: MR imaging of the cervical spine was performed using the following  sequences: sagittal T1, T2, STIR;  axial T2, T1.    CONTRAST:  None. FINDINGS:    Significant anterior bridging osteophytes are present likely related to DISH. There is osseous fusion at C3-4. The patient is status post anterior cervical  fusion at C5-6 with hardware. The patient is status post left-sided  laminoplasties from C3 to C7. Osseous bridging is seen across the disc space  with a prosthetic disc in place. Vertebral body heights are maintained. No acute  fracture. A 9 mm area of fatty deposition is present at C3-4. The craniocervical junction is intact. There is an unchanged 6 mm focus of  chronic myelomalacia in the cord at C3-4. Small foci of chronic malacia are  seen at C6-7. The paraspinal soft tissues are within normal limits. C2-C3: Unchanged mild central disc osteophyte complex without significant spinal  stenosis. Unchanged mild bilateral neural foraminal narrowing. C3-C4: Fused. Mild central osteophyte with minimal spinal stenosis. There is  moderate to severe right and severe left neural foraminal narrowing. Unchanged. C4-C5: Mild broad-based disc osteophyte complex that is worst centrally. No  significant spinal stenosis. There is moderate bilateral neural foraminal  narrowing. Unchanged. C5-C6: Anterior cervical fusion. Mild broad-based disc osteophyte complex with  thickening of ligamentum flavum causing mild spinal stenosis. There is severe  bilateral neural foraminal narrowing. Unchanged. C6-C7: Severe disc space narrowing. No significant spinal stenosis. There is  severe bilateral neural foraminal narrowing. C7-T1: Moderate disc space narrowing. No significant spinal stenosis. There is  mild right and moderate left neural foraminal narrowing.     T1-2: Mild broad-based disc osteophyte complex with thickening of ligamentum  flavum causing moderate spinal stenosis which has worsened. There is severe  right and moderate left neural foraminal narrowing which is relatively  unchanged. Impression  IMPRESSION:    1. Unchanged small areas of chronic myelomalacia at C3-4 and C6-7.  2. Osseous fusion at C3-4. Anterior cervical fusion at C5-6. Posterior left  laminoplasties from C3 to C7.  3. Worsened spondylosis at T1-2. Otherwise unchanged multilevel spondylosis as  above. ASSESSMENT    ICD-10-CM ICD-9-CM    1. Diabetic polyneuropathy associated with type 2 diabetes mellitus (HCC)  E11.42 250.60 pregabalin (LYRICA) 50 mg capsule     357.2       2. Spinal stenosis of lumbar region with neurogenic claudication  M48.062 724.03 pregabalin (LYRICA) 50 mg capsule          DISCUSSION  Mr. Joe Grimaldo has diabetic peripheral neuropathy which is causing pain and paresthesias in his legs below the knees.   Cervical spine imaging in the past has also shown myelomalacia in the cervical cord  He also had degenerative disc and joint disease for which he has had fusion surgeries in the cervical and lumbar spine     Symptoms are controlled with Lyrica 50 mg 4 times daily  He is planning to go back for physical therapy for gait and balance training  Refills were provided for Lyrica total dose of 200 mg/day     Follow-up annually    Eliana Jaime MD  Diplomate, American Board of Psychiatry & Neurology (Neurology)  Diplomate, American Board of Psychiatry & Neurology (Clinical Neurophysiology)  Diplomate, American Board of Electrodiagnostic Medicine

## 2023-07-24 DIAGNOSIS — E11.42 TYPE 2 DIABETES MELLITUS WITH DIABETIC POLYNEUROPATHY, UNSPECIFIED WHETHER LONG TERM INSULIN USE (HCC): Primary | ICD-10-CM

## 2023-07-24 RX ORDER — PREGABALIN 50 MG/1
CAPSULE ORAL
Qty: 120 CAPSULE | Refills: 4 | Status: SHIPPED | OUTPATIENT
Start: 2023-07-24 | End: 2023-11-24

## 2024-01-17 DIAGNOSIS — E11.42 TYPE 2 DIABETES MELLITUS WITH DIABETIC POLYNEUROPATHY, UNSPECIFIED WHETHER LONG TERM INSULIN USE (HCC): ICD-10-CM

## 2024-01-18 RX ORDER — PREGABALIN 50 MG/1
CAPSULE ORAL
Qty: 120 CAPSULE | Refills: 1 | Status: SHIPPED | OUTPATIENT
Start: 2024-01-18 | End: 2024-04-17

## 2024-01-25 ENCOUNTER — APPOINTMENT (OUTPATIENT)
Facility: HOSPITAL | Age: 79
End: 2024-01-25
Payer: MEDICARE

## 2024-01-25 ENCOUNTER — HOSPITAL ENCOUNTER (EMERGENCY)
Facility: HOSPITAL | Age: 79
Discharge: HOME | End: 2024-01-25
Attending: EMERGENCY MEDICINE
Payer: MEDICARE

## 2024-01-25 VITALS
OXYGEN SATURATION: 99 % | SYSTOLIC BLOOD PRESSURE: 140 MMHG | HEART RATE: 60 BPM | RESPIRATION RATE: 17 BRPM | TEMPERATURE: 97.8 F | HEIGHT: 73 IN | BODY MASS INDEX: 35.78 KG/M2 | DIASTOLIC BLOOD PRESSURE: 82 MMHG | WEIGHT: 270 LBS
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
## 2024-01-25 DIAGNOSIS — S01.81XA LACERATION OF FOREHEAD, INITIAL ENCOUNTER: ICD-10-CM

## 2024-01-25 DIAGNOSIS — S09.90XA CLOSED HEAD INJURY, INITIAL ENCOUNTER: ICD-10-CM

## 2024-01-25 PROCEDURE — 99284 EMERGENCY DEPT VISIT MOD MDM: CPT

## 2024-01-25 PROCEDURE — 72125 CT NECK SPINE W/O DYE: CPT

## 2024-01-25 PROCEDURE — 70450 CT HEAD/BRAIN W/O DYE: CPT

## 2024-01-25 PROCEDURE — 12011 RPR F/E/E/N/L/M 2.5 CM/<: CPT

## 2024-01-25 ASSESSMENT — ENCOUNTER SYMPTOMS
DOUBLE VISION: 0
CONSTIPATION: 0
BLURRED VISION: 0
ABDOMINAL PAIN: 0
NAUSEA: 0
COLOR CHANGE: 0
VOMITING: 0
SHORTNESS OF BREATH: 0
BACK PAIN: 0
DIARRHEA: 0

## 2024-01-25 ASSESSMENT — PAIN - FUNCTIONAL ASSESSMENT: PAIN_FUNCTIONAL_ASSESSMENT: 0-10

## 2024-01-25 ASSESSMENT — PAIN SCALES - GENERAL: PAINLEVEL_OUTOF10: 0

## 2024-01-25 NOTE — ED PROVIDER NOTES
noted below, none     Lac Repair    Date/Time: 1/25/2024 1:00 PM    Performed by: German Haro MD  Authorized by: German Haro MD    Consent:     Consent obtained:  Verbal    Consent given by:  Patient    Risks, benefits, and alternatives were discussed: yes      Risks discussed:  Poor cosmetic result    Alternatives discussed:  No treatment  Universal protocol:     Procedure explained and questions answered to patient or proxy's satisfaction: yes      Patient identity confirmed:  Verbally with patient  Anesthesia:     Anesthesia method:  None  Laceration details:     Location:  Face    Face location:  Forehead    Length (cm):  2  Pre-procedure details:     Preparation:  Patient was prepped and draped in usual sterile fashion and imaging obtained to evaluate for foreign bodies  Exploration:     Limited defect created (wound extended): no      Hemostasis achieved with:  Direct pressure    Wound exploration: wound explored through full range of motion and entire depth of wound visualized      Contaminated: no    Treatment:     Area cleansed with:  Soap and water    Amount of cleaning:  Standard    Debridement:  None    Undermining:  None    Scar revision: no    Skin repair:     Repair method:  Tissue adhesive  Approximation:     Approximation:  Loose  Repair type:     Repair type:  Simple  Post-procedure details:     Dressing:  Open (no dressing)    Procedure completion:  Tolerated well, no immediate complications          FINAL IMPRESSION    No diagnosis found.      DISPOSITION/PLAN   DISPOSITION        PATIENT REFERRED TO:  No follow-up provider specified.    DISCHARGE MEDICATIONS:  New Prescriptions    No medications on file     Controlled Substances Monitoring:          No data to display                (Please note that portions of this note were completed with a voice recognition program.  Efforts were made to edit the dictations but occasionally words are mis-transcribed.)    German Haro MD

## 2024-01-25 NOTE — ED TRIAGE NOTES
Cc: Patient states his legs \"fell asleep\" while sitting in his walker. Pt states while attempting to stand he fell and hit his head. Denies LOC and anticoagulants.

## 2024-02-12 DIAGNOSIS — E11.42 TYPE 2 DIABETES MELLITUS WITH DIABETIC POLYNEUROPATHY, UNSPECIFIED WHETHER LONG TERM INSULIN USE (HCC): ICD-10-CM

## 2024-02-12 RX ORDER — PREGABALIN 50 MG/1
CAPSULE ORAL
Qty: 120 CAPSULE | Refills: 0 | Status: SHIPPED | OUTPATIENT
Start: 2024-02-12 | End: 2024-03-13